# Patient Record
Sex: MALE | Race: WHITE | Employment: FULL TIME | URBAN - METROPOLITAN AREA
[De-identification: names, ages, dates, MRNs, and addresses within clinical notes are randomized per-mention and may not be internally consistent; named-entity substitution may affect disease eponyms.]

---

## 2017-12-22 ENCOUNTER — OFFICE VISIT (OUTPATIENT)
Dept: FAMILY MEDICINE CLINIC | Facility: CLINIC | Age: 22
End: 2017-12-22

## 2017-12-22 VITALS
BODY MASS INDEX: 27.72 KG/M2 | HEART RATE: 80 BPM | DIASTOLIC BLOOD PRESSURE: 80 MMHG | SYSTOLIC BLOOD PRESSURE: 130 MMHG | WEIGHT: 239.63 LBS | RESPIRATION RATE: 16 BRPM | TEMPERATURE: 98 F | HEIGHT: 78 IN

## 2017-12-22 DIAGNOSIS — F41.1 ANXIETY REACTION: ICD-10-CM

## 2017-12-22 DIAGNOSIS — G47.9 SLEEP DIFFICULTIES: Primary | ICD-10-CM

## 2017-12-22 PROCEDURE — 99214 OFFICE O/P EST MOD 30 MIN: CPT | Performed by: FAMILY MEDICINE

## 2017-12-22 NOTE — PROGRESS NOTES
Patient presents with: Insomnia: follow up  Anxiety: may be related to Stress- PHQ9 given     HPI:   Judge Habermann is a 20year old male who presents to the office to discuss sleep and anxiety. He was in [place], [place] on a work assignment.   Stress social stresses having impact on him  Office visit lasted 30 minutes, of which 25 were spent counseling the patient on sleep and anxiety. We discussed importance of routines for sleep, meds that help like melatonin.   Healthy sleep hygiene   we discussed a

## 2018-02-27 ENCOUNTER — OFFICE VISIT (OUTPATIENT)
Dept: FAMILY MEDICINE CLINIC | Facility: CLINIC | Age: 23
End: 2018-02-27

## 2018-02-27 VITALS
BODY MASS INDEX: 27.7 KG/M2 | WEIGHT: 239.38 LBS | TEMPERATURE: 98 F | HEIGHT: 78 IN | RESPIRATION RATE: 16 BRPM | SYSTOLIC BLOOD PRESSURE: 120 MMHG | HEART RATE: 80 BPM | DIASTOLIC BLOOD PRESSURE: 80 MMHG

## 2018-02-27 DIAGNOSIS — M75.82 ROTATOR CUFF TENDONITIS, LEFT: Primary | ICD-10-CM

## 2018-02-27 PROCEDURE — 99213 OFFICE O/P EST LOW 20 MIN: CPT | Performed by: FAMILY MEDICINE

## 2018-02-27 NOTE — PROGRESS NOTES
Patient presents with:  Shoulder Pain: left shoulder pain for one week - my have injured it at gym     HPI:   Teo Sutherland is a 20year old male who presents to the office for shoulder injury.   Injured it about a week ago - thinks lifting on Monday - sta

## 2018-03-01 ENCOUNTER — HOSPITAL ENCOUNTER (OUTPATIENT)
Dept: PHYSICAL THERAPY | Facility: HOSPITAL | Age: 23
Setting detail: THERAPIES SERIES
Discharge: HOME | End: 2018-03-01
Attending: FAMILY MEDICINE
Payer: COMMERCIAL

## 2018-03-01 DIAGNOSIS — M75.82 ROTATOR CUFF TENDONITIS, LEFT: ICD-10-CM

## 2018-03-01 PROCEDURE — 97161 PT EVAL LOW COMPLEX 20 MIN: CPT

## 2018-03-01 PROCEDURE — 97110 THERAPEUTIC EXERCISES: CPT

## 2018-03-01 NOTE — PROGRESS NOTES
Health Maintenance Summary     Topic Due On Due Status Completed On    PAP SMEAR - CERVICAL CANCER SCREENING Jan 11, 2020 Not Due Jan 11, 2017    Immunization - TDAP Pregnancy  Completed Jun 12, 2017    IMMUNIZATION - DTaP/Tdap/Td Jun 12, 2027 Not Due Jun 12, 2017    Immunization-Influenza Sep 1, 2017 Not Due Dec 12, 2003          Patient is up to date, no discussion needed .     UPPER EXTREMITY EVALUATION:   Referring Physician: Dr. Tyshawn Guerrero  Diagnosis: L shoulder pain     Date of Service: 3/1/2018     PATIENT SUMMARY   Jae Abel is a 20year old y/o male who presents to therapy today with complaints of L shoulder pain.   Gibran unremarkable  Palpation: slight tenderness to biceps groove, anterior shoulder near [facility]  Sensation: SILT    AROM:   Shoulder    Flexion: R 160; L 160  Abduction: R 170; L 145 +pain  ER: R 75; L 70 +pain  IR: 3 cm difference reaching behind back +pain on L for this course of care. Thank you for your referral. Please co-sign or sign and return this letter via fax as soon as possible to [phone].  If you have any questions, please contact me at Dept: [phone]    Sincerely,  Electronically signed by levi

## 2018-03-05 ENCOUNTER — APPOINTMENT (OUTPATIENT)
Dept: PHYSICAL THERAPY | Facility: HOSPITAL | Age: 23
End: 2018-03-05
Attending: FAMILY MEDICINE
Payer: COMMERCIAL

## 2018-03-08 ENCOUNTER — HOSPITAL ENCOUNTER (OUTPATIENT)
Dept: PHYSICAL THERAPY | Facility: HOSPITAL | Age: 23
Setting detail: THERAPIES SERIES
Discharge: HOME | End: 2018-03-08
Attending: FAMILY MEDICINE
Payer: COMMERCIAL

## 2018-03-08 PROCEDURE — 97110 THERAPEUTIC EXERCISES: CPT

## 2018-03-08 PROCEDURE — 97140 MANUAL THERAPY 1/> REGIONS: CPT

## 2018-03-08 NOTE — PROGRESS NOTES
Dx: L shoulder pain         Authorized # of Visits:  10         Next MD visit: none scheduled  Fall Risk: standard         Precautions: n/a             Subjective: Patient reports no change in symptoms so far.     Objective:   L scapula under-rotates with o Treatment: 43 min  Total Treatment Time: 43 min

## 2018-03-12 ENCOUNTER — HOSPITAL ENCOUNTER (OUTPATIENT)
Dept: PHYSICAL THERAPY | Facility: HOSPITAL | Age: 23
Setting detail: THERAPIES SERIES
Discharge: HOME | End: 2018-03-12
Attending: FAMILY MEDICINE
Payer: COMMERCIAL

## 2018-03-12 PROCEDURE — 97140 MANUAL THERAPY 1/> REGIONS: CPT

## 2018-03-12 PROCEDURE — 97110 THERAPEUTIC EXERCISES: CPT

## 2018-03-12 NOTE — PROGRESS NOTES
Dx: L shoulder pain         Authorized # of Visits:  10         Next MD visit: none scheduled  Fall Risk: standard         Precautions: n/a             Subjective: Patient reports nothing new, but hasn't been testing his shoulder much lately.     Objective: with cane flexion 2x10 AAROM cane flexion 2x10        Prone horizontal abduction 2x20 Prone horizontal abduction 2x20        Wall push ups 2x15 Table push ups 3x10         Red weighted ball pass around back x1' each direction                 [employer]

## 2018-03-15 ENCOUNTER — APPOINTMENT (OUTPATIENT)
Dept: PHYSICAL THERAPY | Facility: HOSPITAL | Age: 23
End: 2018-03-15
Attending: FAMILY MEDICINE
Payer: COMMERCIAL

## 2018-03-19 ENCOUNTER — HOSPITAL ENCOUNTER (OUTPATIENT)
Dept: PHYSICAL THERAPY | Facility: HOSPITAL | Age: 23
Setting detail: THERAPIES SERIES
Discharge: HOME | End: 2018-03-19
Attending: FAMILY MEDICINE
Payer: COMMERCIAL

## 2018-03-19 PROCEDURE — 97140 MANUAL THERAPY 1/> REGIONS: CPT

## 2018-03-19 PROCEDURE — 97110 THERAPEUTIC EXERCISES: CPT

## 2018-03-19 NOTE — PROGRESS NOTES
Dx: L shoulder pain         Authorized # of Visits:  10         Next MD visit: none scheduled  Fall Risk: standard         Precautions: n/a             Subjective: Patient reports nothing new, but hasn't been testing his shoulder much lately.     Objective: degrees of abduction 2x20 sec        7.5# arm dangle circles x1' 8# arm dangle circles x1' 15# arm dangle circles x1'       AAROM with cane flexion 2x10 AAROM cane flexion 2x10        Prone horizontal abduction 2x20 Prone horizontal abduction 2x20 Prone ho

## 2018-03-22 ENCOUNTER — HOSPITAL ENCOUNTER (OUTPATIENT)
Dept: PHYSICAL THERAPY | Facility: HOSPITAL | Age: 23
Setting detail: THERAPIES SERIES
Discharge: HOME | End: 2018-03-22
Attending: FAMILY MEDICINE
Payer: COMMERCIAL

## 2018-03-22 PROCEDURE — 97110 THERAPEUTIC EXERCISES: CPT

## 2018-03-22 PROCEDURE — 97140 MANUAL THERAPY 1/> REGIONS: CPT

## 2018-03-22 NOTE — PROGRESS NOTES
Dx: L shoulder pain         Authorized # of Visits:  10         Next MD visit: none scheduled  Fall Risk: standard         Precautions: n/a             Subjective: Patient reports nothing new at this time.     Objective:   L scapula under-rotates with overh pec stretch at 60 degrees of abduction 2x20 sec        7.5# arm dangle circles x1' 8# arm dangle circles x1' 15# arm dangle circles x1' 15# arm dangle  Circles x1'      AAROM with cane flexion 2x10 AAROM cane flexion 2x10        Prone horizontal abduction

## 2018-03-26 ENCOUNTER — HOSPITAL ENCOUNTER (OUTPATIENT)
Dept: PHYSICAL THERAPY | Facility: HOSPITAL | Age: 23
Setting detail: THERAPIES SERIES
Discharge: HOME | End: 2018-03-26
Attending: FAMILY MEDICINE
Payer: COMMERCIAL

## 2018-03-26 PROCEDURE — 97110 THERAPEUTIC EXERCISES: CPT

## 2018-03-26 PROCEDURE — 97140 MANUAL THERAPY 1/> REGIONS: CPT

## 2018-03-26 NOTE — PROGRESS NOTES
Dx: L shoulder pain         Authorized # of Visits:  10         Next MD visit: none scheduled  Fall Risk: standard         Precautions: n/a             Subjective: Patient reports he was feeling good in the gym then did some flys.   The next morning felt so total Manual posterior and inferior GH glides grade III  theracane to supraspinatus and levator scapulae  theraband tape to levator scapulae and supraspinatus x15' total     Body blade L/R, up/down, A/P 2x20 sec each Body blade L/R, up/down, A/P 3x20 sec e

## 2018-03-28 ENCOUNTER — TELEPHONE (OUTPATIENT)
Dept: FAMILY MEDICINE CLINIC | Facility: CLINIC | Age: 23
End: 2018-03-28

## 2018-03-28 DIAGNOSIS — M75.82 ROTATOR CUFF TENDONITIS, LEFT: Primary | ICD-10-CM

## 2018-03-28 NOTE — TELEPHONE ENCOUNTER
Patient is about 3/4 done with PT, little to no improvement on left shoulder what are thoughts on next step

## 2018-03-29 NOTE — TELEPHONE ENCOUNTER
Called and informed him that Dr Gloria Thompson will order MRI of shoulder he should wait for insurance approval he will schedule when approval is given

## 2018-04-03 ENCOUNTER — TELEPHONE (OUTPATIENT)
Dept: FAMILY MEDICINE CLINIC | Facility: CLINIC | Age: 23
End: 2018-04-03

## 2018-04-03 NOTE — TELEPHONE ENCOUNTER
TC to patient to discuss next step in treatment plan. His MRI was denied by his insurance. It was denied due to the fact the patient had not yet completed the six weeks of PT or had follow-up once the PT was completed.  Per Dr. Florida Petersen options are to complet

## 2018-04-03 NOTE — TELEPHONE ENCOUNTER
Patient returned our call. He would like to finish physical therapy and then will make a return visit to see Dr. Esperanza Davis. I explained MRI denial to patient.    Insurance likes to see that the patient came in for the issue, PT was ordered, completed and then

## 2018-04-06 ENCOUNTER — HOSPITAL ENCOUNTER (OUTPATIENT)
Dept: PHYSICAL THERAPY | Facility: HOSPITAL | Age: 23
Setting detail: THERAPIES SERIES
End: 2018-04-06
Attending: FAMILY MEDICINE
Payer: COMMERCIAL

## 2018-04-09 ENCOUNTER — HOSPITAL ENCOUNTER (OUTPATIENT)
Dept: PHYSICAL THERAPY | Facility: HOSPITAL | Age: 23
Setting detail: THERAPIES SERIES
Discharge: HOME | End: 2018-04-09
Attending: FAMILY MEDICINE
Payer: COMMERCIAL

## 2018-04-09 PROCEDURE — 97140 MANUAL THERAPY 1/> REGIONS: CPT

## 2018-04-09 PROCEDURE — 97110 THERAPEUTIC EXERCISES: CPT

## 2018-04-09 NOTE — PROGRESS NOTES
Dx: L shoulder pain         Authorized # of Visits:  10         Next MD visit: none scheduled  Fall Risk: standard         Precautions: n/a             Subjective: Patient reports no pain with static positions or not moving, but pain has remained the same x15 Lower trap lift offs 2x15 with red weighted ball Lower trap lift offs 2x20 Lower trap lift offs 2x20 Free Motion L shoulder:  Ext 10lbs 2x10  IR 13lbs 2x10  Flex [id number], 10lbs x 10  ER 7lbs 2x10    Manual posterior and inferior GH glides grade III  Ma Red weighted ball pass around back x1' each direction Red weighted ball pass around back x1' each direction Yellow weighted ball pass around back x1' each direction Yellow weighted ball pass around back x1' each direction STM L shoulder x 5mins      2# sca

## 2018-04-12 ENCOUNTER — HOSPITAL ENCOUNTER (OUTPATIENT)
Dept: PHYSICAL THERAPY | Facility: HOSPITAL | Age: 23
Setting detail: THERAPIES SERIES
Discharge: HOME | End: 2018-04-12
Attending: FAMILY MEDICINE
Payer: COMMERCIAL

## 2018-04-12 PROCEDURE — 97140 MANUAL THERAPY 1/> REGIONS: CPT

## 2018-04-12 PROCEDURE — 97110 THERAPEUTIC EXERCISES: CPT

## 2018-04-12 NOTE — PROGRESS NOTES
Dx: L shoulder pain         Authorized # of Visits:  10         Next MD visit: none scheduled  Fall Risk: standard         Precautions: n/a             Subjective: No pain following last visit.  Patient did 5-15lbs side arm raises to parallel and had no talisha 13lbs 2x10  Flex [id number], 10lbs x 10  ER 7lbs 2x10   Manual posterior and inferior GH glides grade III  Manual upward scapular rotation x10 Manual posterior and inferior GH glides grade III Manual posterior and inferior GH glides grade III x10' total Manua 3x10 Continued at home, progressed to push up plus Table push up plus 2x15 Continued at home Manual posterior and inferior L GH glides grade II-III (pain with gr II) x 3mins Manual posterior and inferior L GH glides grade II-III x 3mins    Red weighted bal

## 2018-04-16 ENCOUNTER — APPOINTMENT (OUTPATIENT)
Dept: PHYSICAL THERAPY | Facility: HOSPITAL | Age: 23
End: 2018-04-16
Attending: FAMILY MEDICINE
Payer: COMMERCIAL

## 2018-04-19 ENCOUNTER — APPOINTMENT (OUTPATIENT)
Dept: PHYSICAL THERAPY | Facility: HOSPITAL | Age: 23
End: 2018-04-19
Attending: FAMILY MEDICINE
Payer: COMMERCIAL

## 2018-04-23 ENCOUNTER — APPOINTMENT (OUTPATIENT)
Dept: PHYSICAL THERAPY | Facility: HOSPITAL | Age: 23
End: 2018-04-23
Attending: FAMILY MEDICINE
Payer: COMMERCIAL

## 2018-04-23 ENCOUNTER — HOSPITAL ENCOUNTER (OUTPATIENT)
Dept: PHYSICAL THERAPY | Facility: HOSPITAL | Age: 23
Setting detail: THERAPIES SERIES
Discharge: HOME | End: 2018-04-23
Attending: FAMILY MEDICINE
Payer: COMMERCIAL

## 2018-04-23 PROCEDURE — 97110 THERAPEUTIC EXERCISES: CPT

## 2018-04-23 NOTE — PROGRESS NOTES
Dx: L shoulder pain         Authorized # of Visits:  10         Next MD visit: none scheduled  Fall Risk: standard         Precautions: n/a             Subjective: Pt states \"it still hurts when I use it. \" He tests it each week with things that are uncom flexion and abduction strength by at least 1/2 a grade in order to improve his ability to put groceries away on the top shelf. Plan: Discharge patient next visit if still no no significant improvement in symptoms. Update FOTO. Assess shoulder strength. (pain-free)    Lion ER at 90degs scaption plane green theraband 2x10   pec stretch at 60 degrees of abduction 2x20 sec    Wall push ups with ball x10    Forearm planks on floor x 30secs  Forearm planks on medium swiss ball [id number] Forearm planks on small b for avoiding full ROM due to pain 2# scaption 2x15 cues for avoiding full ROM due to pain 3# supine chest flys 2x15  3# scaption 2x15 cues for avoiding full ROM due to pain  IR with 2lb med ball taps to wall at 90degs abduction 2x20 LUE IR with 2lb med bal

## 2018-04-26 ENCOUNTER — APPOINTMENT (OUTPATIENT)
Dept: PHYSICAL THERAPY | Facility: HOSPITAL | Age: 23
End: 2018-04-26
Payer: COMMERCIAL

## 2018-04-26 ENCOUNTER — APPOINTMENT (OUTPATIENT)
Dept: PHYSICAL THERAPY | Facility: HOSPITAL | Age: 23
End: 2018-04-26
Attending: FAMILY MEDICINE
Payer: COMMERCIAL

## 2018-04-30 ENCOUNTER — HOSPITAL ENCOUNTER (OUTPATIENT)
Dept: PHYSICAL THERAPY | Facility: HOSPITAL | Age: 23
Setting detail: THERAPIES SERIES
Discharge: HOME | End: 2018-04-30
Attending: FAMILY MEDICINE
Payer: COMMERCIAL

## 2018-04-30 PROCEDURE — 97110 THERAPEUTIC EXERCISES: CPT

## 2018-04-30 NOTE — PROGRESS NOTES
Dx: L shoulder pain         Authorized # of Visits:  10         Next MD visit: none scheduled  Fall Risk: standard         Precautions: n/a            Discharge Summary  Pt has attended 10 visits in Physical Therapy.      Subjective: Pt states \"it still hu or affect his QOL. Objective:   Outcome Measures:  FOTO: 64/100 (2 points of functional change since intake), placing him in Stage 4, indicating good shoulder function  Shoulder Special Tests: AC shear test: L slight positive for pain; Crank Test: negati weighted ball x15 Lower trap lift offs 2x15 with red weighted ball Lower trap lift offs 2x20 Lower trap lift offs 2x20 Free Motion L shoulder:  Ext 10lbs 2x10  IR 13lbs 2x10  Flex [id number], 10lbs x 10  ER 7lbs 2x10 Free Motion L shoulder:  Ext 10lbs 2x10  I small blue swiss ball [id number] Forearm planks on small blue swiss ball [id number] Hand plank on round bosu with shoulder taps slow x10 L/R   8# arm dangle circles x1' 15# arm dangle circles x1' 15# arm dangle  Circles x1'  TRX rows 2x10   TRX push ups x10 TR back x1' each direction STM L shoulder x 5mins STM L shoulder x 5mins -- Large body blade through full L shoulder flexion AROM x1min L/R    2# scaption 2x15 cues for avoiding full ROM due to pain 2# scaption 2x15 cues for avoiding full ROM due to pain 3# s

## 2018-05-02 ENCOUNTER — APPOINTMENT (OUTPATIENT)
Dept: PHYSICAL THERAPY | Facility: HOSPITAL | Age: 23
End: 2018-05-02
Attending: FAMILY MEDICINE
Payer: COMMERCIAL

## 2018-05-03 ENCOUNTER — TELEPHONE (OUTPATIENT)
Dept: FAMILY MEDICINE CLINIC | Facility: CLINIC | Age: 23
End: 2018-05-03

## 2018-05-03 NOTE — TELEPHONE ENCOUNTER
Patient was seen for shoulder pain and recommended PT, patient completed his PT 04/27/18 and is not sure if he needs to be seen again or Dr. Jazmine Melgar did discuss further testing.  Patient states that PT has not helped with his pain at all, please advise next

## 2018-05-08 ENCOUNTER — OFFICE VISIT (OUTPATIENT)
Dept: FAMILY MEDICINE CLINIC | Facility: CLINIC | Age: 23
End: 2018-05-08

## 2018-05-08 VITALS
HEIGHT: 78 IN | WEIGHT: 241 LBS | SYSTOLIC BLOOD PRESSURE: 124 MMHG | BODY MASS INDEX: 27.88 KG/M2 | HEART RATE: 82 BPM | DIASTOLIC BLOOD PRESSURE: 72 MMHG | TEMPERATURE: 98 F | RESPIRATION RATE: 14 BRPM

## 2018-05-08 DIAGNOSIS — M75.82 ROTATOR CUFF TENDONITIS, LEFT: Primary | ICD-10-CM

## 2018-05-08 PROCEDURE — 99213 OFFICE O/P EST LOW 20 MIN: CPT | Performed by: FAMILY MEDICINE

## 2018-05-08 NOTE — PROGRESS NOTES
Patient presents with:  Shoulder Pain: Pt is here to f/u for left shoulder pain. Pain level 3-4/10. HPI:   Maria E Fernandes is a 20year old male who presents to the office for re-evaluation of his shoulder. Seen 2/27/18 for rotator cuff.   I recommende insertion. Feels like it is deep in the shoulder. REVIEW OF SYSTEMS:   Pertinent items are noted in HPI.   EXAM:   /72   Pulse 82   Temp 98.3 °F (36.8 °C)   Resp 14   Ht 80\"   Wt 241 lb   BMI 26.48 kg/m²     General appearance - alert, well appe

## 2018-05-15 ENCOUNTER — HOSPITAL ENCOUNTER (OUTPATIENT)
Dept: MRI IMAGING | Age: 23
Discharge: HOME | End: 2018-05-15
Attending: FAMILY MEDICINE
Payer: COMMERCIAL

## 2018-05-15 DIAGNOSIS — M75.82 ROTATOR CUFF TENDONITIS, LEFT: ICD-10-CM

## 2018-05-15 PROCEDURE — 73221 MRI JOINT UPR EXTREM W/O DYE: CPT | Performed by: FAMILY MEDICINE

## 2018-05-21 ENCOUNTER — TELEPHONE (OUTPATIENT)
Dept: FAMILY MEDICINE CLINIC | Facility: CLINIC | Age: 23
End: 2018-05-21

## 2018-05-21 DIAGNOSIS — M25.512 ACUTE PAIN OF LEFT SHOULDER: Primary | ICD-10-CM

## 2018-05-21 NOTE — TELEPHONE ENCOUNTER
Notes recorded by Ninfa Camacho MD on 5/16/2018 at 2:32 PM CDT  Good news is there are no tears seen. There is some inflammation of the tendon noted  Also a mild bursitis.  Both of these could be contributing to the pain.   At this point, I would recom

## 2018-07-30 ENCOUNTER — TELEPHONE (OUTPATIENT)
Dept: FAMILY MEDICINE CLINIC | Facility: CLINIC | Age: 23
End: 2018-07-30

## 2018-07-30 NOTE — TELEPHONE ENCOUNTER
Called and talked to patient had anxiety attack right hand injured has full ROM no deformities no pain swelling is going down he will keep the appointment that he has for now

## 2018-07-30 NOTE — TELEPHONE ENCOUNTER
Pt has schedule an appt for tomorrow though [facility], this is what was listed  \"In a panic during an anxiety attack i had punched a  bathroom stall wall. It does not feel broken. \"    Triage please call and assess injury

## 2018-07-31 ENCOUNTER — OFFICE VISIT (OUTPATIENT)
Dept: FAMILY MEDICINE CLINIC | Facility: CLINIC | Age: 23
End: 2018-07-31
Payer: COMMERCIAL

## 2018-07-31 ENCOUNTER — HOSPITAL ENCOUNTER (OUTPATIENT)
Dept: GENERAL RADIOLOGY | Age: 23
Discharge: HOME | End: 2018-07-31
Attending: FAMILY MEDICINE
Payer: COMMERCIAL

## 2018-07-31 VITALS
SYSTOLIC BLOOD PRESSURE: 118 MMHG | HEART RATE: 84 BPM | HEIGHT: 78 IN | TEMPERATURE: 98 F | RESPIRATION RATE: 16 BRPM | BODY MASS INDEX: 27.65 KG/M2 | WEIGHT: 239 LBS | DIASTOLIC BLOOD PRESSURE: 80 MMHG

## 2018-07-31 DIAGNOSIS — M79.641 PAIN OF RIGHT HAND: Primary | ICD-10-CM

## 2018-07-31 DIAGNOSIS — M79.641 PAIN OF RIGHT HAND: ICD-10-CM

## 2018-07-31 PROCEDURE — 99213 OFFICE O/P EST LOW 20 MIN: CPT | Performed by: FAMILY MEDICINE

## 2018-07-31 PROCEDURE — 73130 X-RAY EXAM OF HAND: CPT | Performed by: FAMILY MEDICINE

## 2018-07-31 NOTE — PROGRESS NOTES
Cristy Chung is a 24year old male. HPI:   Patient presents with pain in the right hand. Punched bathroom stall wall 4 days ago. Reports out of anger from specific event. Denies any anxiety or depression currently.   Swelling is better, pain is bet

## 2018-08-14 ENCOUNTER — APPOINTMENT (OUTPATIENT)
Dept: GENERAL RADIOLOGY | Age: 23
End: 2018-08-14
Attending: EMERGENCY MEDICINE
Payer: COMMERCIAL

## 2018-08-14 ENCOUNTER — HOSPITAL ENCOUNTER (EMERGENCY)
Age: 23
Discharge: HOME | End: 2018-08-14
Attending: EMERGENCY MEDICINE
Payer: COMMERCIAL

## 2018-08-14 VITALS
SYSTOLIC BLOOD PRESSURE: 146 MMHG | DIASTOLIC BLOOD PRESSURE: 92 MMHG | OXYGEN SATURATION: 98 % | HEART RATE: 80 BPM | RESPIRATION RATE: 18 BRPM | WEIGHT: 240 LBS | HEIGHT: 78 IN | BODY MASS INDEX: 27.77 KG/M2 | TEMPERATURE: 98 F

## 2018-08-14 DIAGNOSIS — S60.222A CONTUSION OF LEFT HAND, INITIAL ENCOUNTER: Primary | ICD-10-CM

## 2018-08-14 PROCEDURE — 99283 EMERGENCY DEPT VISIT LOW MDM: CPT

## 2018-08-14 PROCEDURE — 73130 X-RAY EXAM OF HAND: CPT | Performed by: EMERGENCY MEDICINE

## 2018-08-15 NOTE — ED PROVIDER NOTES
Patient Seen in: THE [facility] Emergency Department In [place]    History   Patient presents with:  Hand Injury    Stated Complaint:     HPI    Patient was working on his car in the [place] striking the dorsum of the left hand.   Complaining of pain over th 7/31/2018 at 15:03     Approved by: Roseann Barnes MD            ED Course as of Aug 15 0316  ------------------------------------------------------------      MDM     Contusion of left hand. Advised ice packs off and on.   Tylenol or ibuprofen for talisha

## 2018-08-15 NOTE — ED INITIAL ASSESSMENT (HPI)
Pt to ed for eval of left hand pain inj pt states that he was changing the head light on his car and the [place] came down and slammed down onto it

## 2018-08-16 ENCOUNTER — TELEPHONE (OUTPATIENT)
Dept: FAMILY MEDICINE CLINIC | Facility: CLINIC | Age: 23
End: 2018-08-16

## 2018-08-16 NOTE — TELEPHONE ENCOUNTER
This is an ED outreach call for a visit to THE [facility] [facility] ED 8/14/18 for an injury to the L hand. Spoke with Anil Schneider. He said the injury is improving after icing and alternating ibuprofen and tylenol for pain.   He doesn't feel he needs a f/u appointment at this

## 2018-10-31 ENCOUNTER — TELEPHONE (OUTPATIENT)
Dept: FAMILY MEDICINE CLINIC | Facility: CLINIC | Age: 23
End: 2018-10-31

## 2018-10-31 NOTE — TELEPHONE ENCOUNTER
Received medical records request from [address] [place] [address] requesting patient's medical records from 01/2018 to present.  Records were printed and faxed to [address] [place] [address] at [phone]

## 2019-08-15 NOTE — LETTER
Patient Name: Clara Barnes  YOB: 1995          MRN number:  [id number]  Date:  4/30/2018  Referring Physician: Dr. Jazmine Melgar    Physical Therapy Discharge Summary  Pt has attended 10 visits in Physical Therapy.      Subjective: Pt states \"it s to his MD if symptoms continue to bother him or affect his QOL. Objective:   Outcome Measures:  FOTO: 64/100 (2 points of functional change since intake), placing him in Stage 4, indicating good shoulder function  Shoulder Special Tests: AC shear test: L [Normal] : normoactive bowel sounds, soft and nontender, no hepatosplenomegaly or masses appreciated

## 2019-10-22 ENCOUNTER — OFFICE VISIT (OUTPATIENT)
Dept: FAMILY MEDICINE CLINIC | Facility: CLINIC | Age: 24
End: 2019-10-22
Payer: COMMERCIAL

## 2019-10-22 VITALS
SYSTOLIC BLOOD PRESSURE: 130 MMHG | DIASTOLIC BLOOD PRESSURE: 78 MMHG | OXYGEN SATURATION: 99 % | HEIGHT: 78 IN | RESPIRATION RATE: 14 BRPM | WEIGHT: 240 LBS | BODY MASS INDEX: 27.77 KG/M2 | TEMPERATURE: 98 F | HEART RATE: 70 BPM

## 2019-10-22 DIAGNOSIS — R05.9 COUGH IN ADULT: Primary | ICD-10-CM

## 2019-10-22 PROCEDURE — 94640 AIRWAY INHALATION TREATMENT: CPT | Performed by: NURSE PRACTITIONER

## 2019-10-22 PROCEDURE — 99213 OFFICE O/P EST LOW 20 MIN: CPT | Performed by: NURSE PRACTITIONER

## 2019-10-22 RX ORDER — FLUTICASONE PROPIONATE 50 MCG
1 SPRAY, SUSPENSION (ML) NASAL 2 TIMES DAILY
Qty: 1 BOTTLE | Refills: 1 | Status: SHIPPED | OUTPATIENT
Start: 2019-10-22 | End: 2019-11-21

## 2019-10-22 RX ORDER — ALBUTEROL SULFATE 2.5 MG/3ML
2.5 SOLUTION RESPIRATORY (INHALATION) ONCE
Status: COMPLETED | OUTPATIENT
Start: 2019-10-22 | End: 2019-10-22

## 2019-10-22 RX ADMIN — ALBUTEROL SULFATE 2.5 MG: 2.5 SOLUTION RESPIRATORY (INHALATION) at 14:09:00

## 2019-10-22 NOTE — PATIENT INSTRUCTIONS
· Please start Flonase 1 spray each nostril twice daily before brushing teeth to help decrease nasal congestion and post nasal drip. Use for about one to two weeks. May stop if improving. Restart if symptoms return.   · Use Albuterol inhaler-1-2 puffs every

## 2019-10-22 NOTE — PROGRESS NOTES
HPI:   Jovani Baptiste is a 21year old male who presents with ill symptoms for  1  weeks.  Patient reports started as nasal congestion and cough, now upper congestion has mostly resolved but currently with persistent cough, often productive, making sleep di lobes.  CARDIO: RRR without murmur  Patient with diminished air entry to bases, cough. Nebulizer treatment of Albuterol given to patient with good results, patient describes increased comfort with breathing and \"relaxed\" lungs. Recheck of SpO2 is 99%.  Jerod Ruffin

## 2020-01-07 ENCOUNTER — OFFICE VISIT (OUTPATIENT)
Dept: FAMILY MEDICINE CLINIC | Facility: CLINIC | Age: 25
End: 2020-01-07
Payer: COMMERCIAL

## 2020-01-07 VITALS
HEIGHT: 78 IN | RESPIRATION RATE: 16 BRPM | TEMPERATURE: 98 F | BODY MASS INDEX: 27.21 KG/M2 | SYSTOLIC BLOOD PRESSURE: 128 MMHG | HEART RATE: 72 BPM | DIASTOLIC BLOOD PRESSURE: 70 MMHG | WEIGHT: 235.19 LBS

## 2020-01-07 DIAGNOSIS — M25.512 ACUTE PAIN OF LEFT SHOULDER: Primary | ICD-10-CM

## 2020-01-07 DIAGNOSIS — M79.672 ACUTE FOOT PAIN, LEFT: ICD-10-CM

## 2020-01-07 PROCEDURE — 99213 OFFICE O/P EST LOW 20 MIN: CPT | Performed by: FAMILY MEDICINE

## 2020-01-07 NOTE — PROGRESS NOTES
Patient presents with:  Shoulder Pain: Pt c/o left shoulder pain for last 2 weeks. Foot Pain: Pt feels \"pinch\" from left toe to top of foot while flexing foot.      HPI:   Devan Monae is a 21year old male who presents to the office for eval of NOEMI woods more accommodating shoes         Sarina Vazquez M.D.   EMG 3  01/07/20

## 2020-02-20 ENCOUNTER — HOSPITAL ENCOUNTER (OUTPATIENT)
Dept: MRI IMAGING | Age: 25
Discharge: HOME | End: 2020-02-20
Attending: ORTHOPAEDIC SURGERY
Payer: COMMERCIAL

## 2020-02-20 DIAGNOSIS — S43.422A SPRAIN OF LEFT ROTATOR CUFF CAPSULE, INITIAL ENCOUNTER: ICD-10-CM

## 2020-02-20 PROCEDURE — 73221 MRI JOINT UPR EXTREM W/O DYE: CPT | Performed by: ORTHOPAEDIC SURGERY

## 2020-02-21 NOTE — PROGRESS NOTES
MRI results reviewed, tendinitis of the rotator cuff noted, no tear present. We can discuss further treatment options at the next appointment.

## 2021-04-14 ENCOUNTER — HOSPITAL ENCOUNTER (EMERGENCY)
Age: 26
Discharge: HOME | End: 2021-04-14
Attending: EMERGENCY MEDICINE
Payer: COMMERCIAL

## 2021-04-14 VITALS
HEIGHT: 78 IN | BODY MASS INDEX: 27.19 KG/M2 | DIASTOLIC BLOOD PRESSURE: 87 MMHG | TEMPERATURE: 98 F | SYSTOLIC BLOOD PRESSURE: 151 MMHG | OXYGEN SATURATION: 98 % | HEART RATE: 64 BPM | RESPIRATION RATE: 16 BRPM | WEIGHT: 235 LBS

## 2021-04-14 DIAGNOSIS — K92.2 LOWER GI BLEED: Primary | ICD-10-CM

## 2021-04-14 PROCEDURE — 85610 PROTHROMBIN TIME: CPT | Performed by: PHYSICIAN ASSISTANT

## 2021-04-14 PROCEDURE — 99283 EMERGENCY DEPT VISIT LOW MDM: CPT

## 2021-04-14 PROCEDURE — 85730 THROMBOPLASTIN TIME PARTIAL: CPT | Performed by: PHYSICIAN ASSISTANT

## 2021-04-14 PROCEDURE — 85025 COMPLETE CBC W/AUTO DIFF WBC: CPT | Performed by: PHYSICIAN ASSISTANT

## 2021-04-14 PROCEDURE — 36415 COLL VENOUS BLD VENIPUNCTURE: CPT

## 2021-04-14 PROCEDURE — 80053 COMPREHEN METABOLIC PANEL: CPT | Performed by: PHYSICIAN ASSISTANT

## 2021-04-14 RX ORDER — OMEPRAZOLE 20 MG/1
20 CAPSULE, DELAYED RELEASE ORAL DAILY
Qty: 30 CAPSULE | Refills: 0 | Status: SHIPPED | OUTPATIENT
Start: 2021-04-14 | End: 2021-05-14

## 2021-04-14 NOTE — ED PROVIDER NOTES
Patient Seen in: THE [facility] Emergency Department In [place]      History   Patient presents with:  GI Bleeding    Stated Complaint: noticed blood in stool today- Covid vaccine this am    HPI/Subjective:   HPI    CHIEF COMPLAINT: Rectal bleeding    HISTORY reviewed and negative except as noted above.     Physical Exam     ED Triage Vitals [04/14/21 1406]   /87   Pulse 64   Resp 16   Temp 97.8 °F (36.6 °C)   Temp src Temporal   SpO2 98 %   O2 Device None (Room air)       Current:/87   Pulse 64   Te CBC W/ DIFFERENTIAL[[id number]]                              Final result                 Please view results for these tests on the individual orders.    CBC W/ DIFFERENTIAL       Patient IV established and labs drawn patient's PT, PTT were unremark emergency department.                      Disposition and Plan     Clinical Impression:  Lower GI bleed  (primary encounter diagnosis)     Disposition:  Discharge  4/14/2021  3:14 pm    Follow-up:  Wendy Luther MD  [address] [place] [address]

## 2021-04-14 NOTE — ED INITIAL ASSESSMENT (HPI)
Had 1st dose of Covid vaccine this am around 1045- approx 30 mins later notice blood in stool.   Had 2nd episode around 1330- no abd pain or other complaints

## 2021-04-16 ENCOUNTER — TELEPHONE (OUTPATIENT)
Dept: FAMILY MEDICINE CLINIC | Facility: CLINIC | Age: 26
End: 2021-04-16

## 2021-04-16 NOTE — TELEPHONE ENCOUNTER
Called and talked to mother and went over that he can take tylenol for this and it won't cause any bleeding and he should stay hydrated.  She will do these things

## 2021-04-16 NOTE — TELEPHONE ENCOUNTER
Pt was in ER for lower GI bleeding. Pt also got the [employer] covid vaccine the same day. Pt is now running a fever, body aches, chills. Mom would like to know what he can take for fever being that he has GI bleeding.      Mom can be reached at [phone]

## 2021-04-19 ENCOUNTER — TELEPHONE (OUTPATIENT)
Dept: FAMILY MEDICINE CLINIC | Facility: CLINIC | Age: 26
End: 2021-04-19

## 2021-04-19 NOTE — TELEPHONE ENCOUNTER
Pt requesting for a call back from Dr. Lara Avitia regarding a COVID test no further information given.

## 2021-04-19 NOTE — TELEPHONE ENCOUNTER
Pt states tested Positve for Covid today. Pt states he had an exposure last week and started symptoms 4/15/21.  Pt had Covid vaccine 4/13/21  Pt states fever broke and only c/o slight sore throat, no difficultly breathing    • Patient directed to isolate aw

## 2021-05-20 ENCOUNTER — PATIENT MESSAGE (OUTPATIENT)
Dept: FAMILY MEDICINE CLINIC | Facility: CLINIC | Age: 26
End: 2021-05-20

## 2021-05-20 NOTE — TELEPHONE ENCOUNTER
From: Jovani Baptiste  To: Iris Mosley MD  Sent: 5/20/2021 7:06 AM CDT  Subject: Non-Urgent [place] Getting i hope this message finds you well. If you remember i was diagnosed with covid a month ago or so. Around April 16th.  I herminia

## 2021-05-21 PROBLEM — M99.09 SEGMENTAL AND SOMATIC DYSFUNCTION: Status: ACTIVE | Noted: 2018-10-19

## 2021-05-21 PROBLEM — M89.512 OSTEOLYSIS OF ACROMIAL END OF LEFT CLAVICLE: Status: ACTIVE | Noted: 2018-11-16

## 2021-05-24 ENCOUNTER — OFFICE VISIT (OUTPATIENT)
Dept: FAMILY MEDICINE CLINIC | Facility: CLINIC | Age: 26
End: 2021-05-24
Payer: COMMERCIAL

## 2021-05-24 VITALS
OXYGEN SATURATION: 99 % | SYSTOLIC BLOOD PRESSURE: 140 MMHG | DIASTOLIC BLOOD PRESSURE: 66 MMHG | BODY MASS INDEX: 27.77 KG/M2 | TEMPERATURE: 98 F | WEIGHT: 240 LBS | RESPIRATION RATE: 18 BRPM | HEIGHT: 78 IN | HEART RATE: 92 BPM

## 2021-05-24 DIAGNOSIS — J98.01 BRONCHOSPASM: ICD-10-CM

## 2021-05-24 DIAGNOSIS — J06.9 VIRAL URI WITH COUGH: Primary | ICD-10-CM

## 2021-05-24 PROCEDURE — 3078F DIAST BP <80 MM HG: CPT | Performed by: NURSE PRACTITIONER

## 2021-05-24 PROCEDURE — 3008F BODY MASS INDEX DOCD: CPT | Performed by: NURSE PRACTITIONER

## 2021-05-24 PROCEDURE — 99213 OFFICE O/P EST LOW 20 MIN: CPT | Performed by: NURSE PRACTITIONER

## 2021-05-24 PROCEDURE — 3077F SYST BP >= 140 MM HG: CPT | Performed by: NURSE PRACTITIONER

## 2021-05-24 RX ORDER — ALBUTEROL SULFATE 90 UG/1
2 AEROSOL, METERED RESPIRATORY (INHALATION) EVERY 4 HOURS PRN
Qty: 1 INHALER | Refills: 0 | Status: SHIPPED | OUTPATIENT
Start: 2021-05-24 | End: 2021-06-07

## 2021-05-24 NOTE — PROGRESS NOTES
CHIEF COMPLAINT:   Patient presents with:  Cold  Cough: + bronchospasms. few days      HPI:   Judge Habermann is a 31year old male who presents for upper respiratory symptoms for  a few days days. Patient reports dry cough, bronchospasms.  Symptoms have be not erythematous. no exudates. Tonsils 0/4. NECK: Supple, non-tender  LUNGS: clear to auscultation bilaterally, no wheezes or rhonchi. Breathing is non labored.   CARDIO: RRR without murmur  EXTREMITIES: no cyanosis, clubbing or edema  LYMPH:  No cervica viral infection. Home care  · Drink lots of water or other fluids (at least 10 glasses a day) during an attack. This will loosen lung secretions and make it easier to breathe.  If you have heart or kidney disease, check with your doctor before you drink ex

## 2021-05-28 ENCOUNTER — TELEPHONE (OUTPATIENT)
Dept: FAMILY MEDICINE CLINIC | Facility: CLINIC | Age: 26
End: 2021-05-28

## 2021-05-28 ENCOUNTER — TELEMEDICINE (OUTPATIENT)
Dept: FAMILY MEDICINE CLINIC | Facility: CLINIC | Age: 26
End: 2021-05-28

## 2021-05-28 VITALS — WEIGHT: 240 LBS | BODY MASS INDEX: 27.77 KG/M2 | HEIGHT: 78 IN

## 2021-05-28 DIAGNOSIS — R05.9 COUGH: Primary | ICD-10-CM

## 2021-05-28 PROCEDURE — 3008F BODY MASS INDEX DOCD: CPT | Performed by: FAMILY MEDICINE

## 2021-05-28 PROCEDURE — 99213 OFFICE O/P EST LOW 20 MIN: CPT | Performed by: FAMILY MEDICINE

## 2021-05-28 RX ORDER — PREDNISONE 20 MG/1
40 TABLET ORAL DAILY
Qty: 10 TABLET | Refills: 0 | Status: SHIPPED | OUTPATIENT
Start: 2021-05-28 | End: 2021-06-02

## 2021-05-28 RX ORDER — AZITHROMYCIN 250 MG/1
TABLET, FILM COATED ORAL
Qty: 6 TABLET | Refills: 0 | Status: SHIPPED | OUTPATIENT
Start: 2021-05-28 | End: 2021-06-02

## 2021-05-28 NOTE — PROGRESS NOTES
Patient presents with:  Breathing Problem: Cough & Wheezing    Tyra Barfield is a 31year old male who presents for had concerns including Breathing Problem (Cough & Wheezing).  Encounter via video visit    Objective:   HPI:   Patient is a healthy 30yo mal No follow-ups on file. Supplementary Documentation: This visit is conducted using Telemedicine with live, interactive video and audio.      Berto Owen M.D.   EMG 3  05/28/21

## 2021-05-28 NOTE — TELEPHONE ENCOUNTER
LOV 1/7/2020 with Dr Shayy Gordon no physical on record. Seen at [facility] told it was viral given albuterol which is not helping with the cough denies fever non productive cough had covid in 2nd week of April has not used OTC cough meds.  I will ask Dr Shayy Gordon if there

## 2021-05-28 NOTE — TELEPHONE ENCOUNTER
Pt c/o productive cough x 2 weeks. Worse in the am/pm. Walk in care put him on albuterol but not helping. Pt would like a call back.

## 2021-06-17 ENCOUNTER — HOSPITAL ENCOUNTER (OUTPATIENT)
Dept: GENERAL RADIOLOGY | Age: 26
Discharge: HOME | End: 2021-06-17
Attending: FAMILY MEDICINE
Payer: COMMERCIAL

## 2021-06-17 DIAGNOSIS — R05.9 COUGH: ICD-10-CM

## 2021-06-17 PROCEDURE — 71046 X-RAY EXAM CHEST 2 VIEWS: CPT | Performed by: FAMILY MEDICINE

## 2021-06-25 ENCOUNTER — PATIENT MESSAGE (OUTPATIENT)
Dept: FAMILY MEDICINE CLINIC | Facility: CLINIC | Age: 26
End: 2021-06-25

## 2021-06-25 DIAGNOSIS — R05.9 COUGH: Primary | ICD-10-CM

## 2021-06-25 NOTE — TELEPHONE ENCOUNTER
From: Teo Sutherland  To: Dez Shelton MD  Sent: 6/25/2021 11:52 AM CDT  Subject: Visit Yesy Springer,     I hope this message finds you well. Just sending you a follow up message regarding my cough and my lungs.  I still cough

## 2023-05-12 ENCOUNTER — TELEPHONE (OUTPATIENT)
Dept: FAMILY MEDICINE CLINIC | Facility: CLINIC | Age: 28
End: 2023-05-12

## 2023-05-12 DIAGNOSIS — Z13.220 LIPID SCREENING: Primary | ICD-10-CM

## 2023-05-12 NOTE — TELEPHONE ENCOUNTER
Please enter lab orders for the patient's upcoming physical appointment. Physical scheduled: Your appointments     Date & Time Appointment Department [facility])    Jun 06, 2023  3:15 PM CDT Physical - Established with Mio Puentes MD [address], [address] ,[address], [facility] ([place])            [place] [facility] [address] [facility] [phone]         Preferred lab: [facility]     The patient has been notified to complete fasting labs prior to their physical appointment.

## 2023-05-23 LAB
ALBUMIN/GLOBULIN RATIO: 2.2 (CALC) (ref 1–2.5)
ALBUMIN: 4.7 G/DL (ref 3.6–5.1)
ALKALINE PHOSPHATASE: 63 U/L (ref 36–130)
ALT: 31 U/L (ref 9–46)
AST: 19 U/L (ref 10–40)
BILIRUBIN, TOTAL: 1.1 MG/DL (ref 0.2–1.2)
BUN: 13 MG/DL (ref 7–25)
CALCIUM: 9.5 MG/DL (ref 8.6–10.3)
CARBON DIOXIDE: 26 MMOL/L (ref 20–32)
CHLORIDE: 102 MMOL/L (ref 98–110)
CHOL/HDLC RATIO: 3 (CALC)
CHOLESTEROL, TOTAL: 186 MG/DL
CREATININE: 0.84 MG/DL (ref 0.6–1.24)
EGFR: 122 ML/MIN/1.73M2
GLOBULIN: 2.1 G/DL (CALC) (ref 1.9–3.7)
GLUCOSE: 85 MG/DL (ref 65–99)
HDL CHOLESTEROL: 63 MG/DL
LDL-CHOLESTEROL: 107 MG/DL (CALC)
NON-HDL CHOLESTEROL: 123 MG/DL (CALC)
POTASSIUM: 4.4 MMOL/L (ref 3.5–5.3)
PROTEIN, TOTAL: 6.8 G/DL (ref 6.1–8.1)
SODIUM: 136 MMOL/L (ref 135–146)
TRIGLYCERIDES: 70 MG/DL

## 2023-07-12 ENCOUNTER — OFFICE VISIT (OUTPATIENT)
Dept: FAMILY MEDICINE CLINIC | Facility: CLINIC | Age: 28
End: 2023-07-12
Payer: COMMERCIAL

## 2023-07-12 VITALS
SYSTOLIC BLOOD PRESSURE: 140 MMHG | WEIGHT: 245.38 LBS | DIASTOLIC BLOOD PRESSURE: 70 MMHG | HEIGHT: 78 IN | OXYGEN SATURATION: 99 % | BODY MASS INDEX: 28.39 KG/M2 | HEART RATE: 74 BPM

## 2023-07-12 DIAGNOSIS — Z00.00 ANNUAL PHYSICAL EXAM: Primary | ICD-10-CM

## 2023-07-12 PROCEDURE — 3078F DIAST BP <80 MM HG: CPT | Performed by: FAMILY MEDICINE

## 2023-07-12 PROCEDURE — 99395 PREV VISIT EST AGE 18-39: CPT | Performed by: FAMILY MEDICINE

## 2023-07-12 PROCEDURE — 3008F BODY MASS INDEX DOCD: CPT | Performed by: FAMILY MEDICINE

## 2023-07-12 PROCEDURE — 3077F SYST BP >= 140 MM HG: CPT | Performed by: FAMILY MEDICINE

## (undated) NOTE — ED AVS SNAPSHOT
Halmiley Rajni   MRN: [id number]    Department:  THE [facility] Emergency Department in [place]   Date of Visit:  8/14/2018           Disclosure     Insurance plans vary and the physician(s) referred by the ER may not be covered by your plan.  Please contact tell this physician (or your personal doctor if your instructions are to return to your personal doctor) about any new or lasting problems. The primary care or specialist physician will see patients referred from the [facility] Emergency Department.  Page Stanton